# Patient Record
Sex: MALE
[De-identification: names, ages, dates, MRNs, and addresses within clinical notes are randomized per-mention and may not be internally consistent; named-entity substitution may affect disease eponyms.]

---

## 2018-12-27 ENCOUNTER — HOSPITAL ENCOUNTER (OUTPATIENT)
Dept: HOSPITAL 53 - M RAD | Age: 28
End: 2018-12-27
Attending: OTOLARYNGOLOGY
Payer: COMMERCIAL

## 2018-12-27 DIAGNOSIS — J34.1: ICD-10-CM

## 2018-12-27 DIAGNOSIS — J32.0: Primary | ICD-10-CM

## 2018-12-27 DIAGNOSIS — J34.2: ICD-10-CM

## 2018-12-27 NOTE — REP
MAXILLOFACIAL CT WITHOUT CONTRAST:

 

HISTORY:  Chronic maxillary sinusitis.  The patient reported that he was

uncertain whether yet previous sinus surgery as a child.

 

TECHNIQUE:  Helical scanning is acquired.  Axial and MPR coronal images are

generated.

 

CT FINDINGS:  Digital preliminary  views are unremarkable. There are

multiple mucous retention cysts in the maxillary sinuses bilaterally.  The

largest of these is on the right measuring 2.5 cm in greatest diameter.  There is

fairly extensive bilateral ethmoid sinus opacification.  The frontal sinuses are

clear with minimal mucosal changes inferiorly and medially.  Sphenoid aeration is

normal with mild mucosal thickening along the anterior wall of the sphenoid sinus

bilaterally.  Mastoid aeration is normal and symmetric.  No intraorbital

abnormality is seen.  No definite postoperative changes are seen.  The ostium of

each ostiomeatal complexes are obscured by mucosal thickening and mucus retention

cyst formation.  The bony nasal septum deviates to the left with a septal beak.

No nasal polyp is appreciated.

 

IMPRESSION: Jackelin sinusitis changes as above.  Leftward nasal septal deviation.

Both OMCs are obscured by mucosal thickening and mucus retention cysts.

 

 

Electronically Signed by

Dejan Ponce MD 12/27/2018 11:51 A

## 2019-06-18 ENCOUNTER — HOSPITAL ENCOUNTER (OUTPATIENT)
Dept: HOSPITAL 53 - M SDC | Age: 29
Discharge: HOME | End: 2019-06-18
Attending: OTOLARYNGOLOGY
Payer: COMMERCIAL

## 2019-06-18 VITALS — HEIGHT: 70 IN | WEIGHT: 110.4 LBS | BODY MASS INDEX: 15.81 KG/M2

## 2019-06-18 VITALS — SYSTOLIC BLOOD PRESSURE: 130 MMHG | DIASTOLIC BLOOD PRESSURE: 63 MMHG

## 2019-06-18 DIAGNOSIS — J34.2: ICD-10-CM

## 2019-06-18 DIAGNOSIS — J34.89: ICD-10-CM

## 2019-06-18 DIAGNOSIS — J32.9: Primary | ICD-10-CM

## 2019-06-18 DIAGNOSIS — J34.3: ICD-10-CM

## 2019-06-18 PROCEDURE — 30520 REPAIR OF NASAL SEPTUM: CPT

## 2019-06-18 PROCEDURE — 88300 SURGICAL PATH GROSS: CPT

## 2019-06-18 PROCEDURE — 88305 TISSUE EXAM BY PATHOLOGIST: CPT

## 2019-06-18 PROCEDURE — 31295 NSL/SINS NDSC SURG MAX SINS: CPT

## 2019-06-18 PROCEDURE — 31296 NSL/SINS NDSC SURG FRNT SINS: CPT

## 2019-06-18 PROCEDURE — 31254 NSL/SINS NDSC W/PRTL ETHMDCT: CPT

## 2019-06-18 PROCEDURE — 30140 RESECT INFERIOR TURBINATE: CPT

## 2019-06-18 NOTE — RO
DATE OF PROCEDURE:  06/18/2019

 

PREOPERATIVE DIAGNOSIS:  Chronic bilateral sinusitis with septal deviation,

turbinate hypertrophy, and a posterior septal spur.

 

POSTOPERATIVE DIAGNOSIS:

 

OPERATION PERFORMED:

1.  Bilateral balloon frontal sinuplasty with the Acclarent balloon device.

2.  Bilateral partial ethmoidectomy utilizing microdebrider.

3.  Bilateral maxillary sinusotomy using the Acclarent balloon device.

4.  In addition, the patient had septoplasty and bilateral inferior

turbinoplasties.

 

SURGEON:  Mane Elena Jr, MD

 

ASSISTANT:

 

ANESTHESIA:  General via endotracheal tube by Dr. Douglas.

 

INDICATIONS FOR PROCEDURE:  Patient with nasal congestion and bilateral chronic

sinusitis recalcitrant to medical therapy.

 

DESCRIPTION OF PROCEDURE:  After the patient was induced, intubated, prepped and

draped in usual fashion, nasal cavity was decongested with topical Afrin-soaked

pledgets followed by injection of 4-1/2 mL of 1% lidocaine, 1:100,000 epinephrine

with a 27-gauge needle.  Once this was done in both septums and also at the base

of the middle turbinate, the areas were further evaluated, and the left

hemitransfixion incision ensued, mucoperichondrium, mucoperiosteum, bony

cartilaginous junctions were .  A large posterior septal spur was

removed with Agatha posteriorly.  Superiorly, the deviated septum obstructing

the view of the left sinus was removed with Ruy-Garsia forceps near the

perpendicular plate of the ethmoid, and then more bone was removed with the

Ruy-Garsia posteriorly, inferiorly.

 

Once the septum was nice and straight, attention then was drawn to the endoscopic

portion where initially balloon dilation was done with the left frontal Acclarent

balloon device.  The left frontal sinus was cannulated and once it was

cannulated, was dilated up to 12 cm.  This was successfully dilated, a nice

opening was present.  A contour Propel device was placed in the frontal ostium.

 

Attention then was drawn to the right side where in a similar fashion it was

successfully cannulated and then also dilated.  At this point, this one was a

little bit more tight, but it was dilated up to 12 cm, and there were also polyps

present in the ethmoid.  The maxillary one was done on the left side was

cannulated with good free flow in the maxillary sinus with the Anahy device.  This

was then also ballooned up to 12 cm after confirmation with easily mobile Anahy

device.  And in a similar fashion, the same thing on the right side - it was

cannulated and again with good illumination of the cheek, this was also dilated

to 12 cm. Partial ethmoidectomy was done with the StraightShot microdebrider and

then Propel mini splints were placed in both partial ethmoidectomy sites and

hydrated as well as the contour.

 

At this point, attention was drawn to the inferior turbinate where utilizing the

Reflex 45 wand, three passes were made on the right inferior turbinate with

settings of 4 and 2 in the usual cauterization pattern.  In a similar fashion,

the left side was also done but with only two on the left side as this was the

side that was not as hypertrophied.

 

Then, #3-0 chromic was used to perform two septocolumellar stitches to close the

inferior aspect of the septum followed by transnasal suturing of Aguirre nasal

splints bilaterally and with 4-0 nylon.  The patient tolerated this well and then

NasoPore was trimmed to place around that area and there were no problems, no

complications.

 

Estimated blood loss was approximately 15-20 mL.

 

The patient was taken to recovery room in satisfactory condition.

## 2019-09-23 ENCOUNTER — HOSPITAL ENCOUNTER (EMERGENCY)
Dept: HOSPITAL 53 - M ED | Age: 29
Discharge: HOME | End: 2019-09-23
Payer: COMMERCIAL

## 2019-09-23 VITALS — HEIGHT: 67 IN | BODY MASS INDEX: 28.75 KG/M2 | WEIGHT: 183.2 LBS

## 2019-09-23 VITALS — DIASTOLIC BLOOD PRESSURE: 79 MMHG | SYSTOLIC BLOOD PRESSURE: 120 MMHG

## 2019-09-23 DIAGNOSIS — R00.2: Primary | ICD-10-CM

## 2019-09-23 DIAGNOSIS — K21.9: ICD-10-CM

## 2019-09-23 DIAGNOSIS — R07.89: ICD-10-CM

## 2019-09-23 LAB
BASOPHILS # BLD AUTO: 0.1 10^3/UL (ref 0–0.2)
BASOPHILS NFR BLD AUTO: 0.9 % (ref 0–1)
BUN SERPL-MCNC: 14 MG/DL (ref 7–18)
CALCIUM SERPL-MCNC: 9.2 MG/DL (ref 8.5–10.1)
CHLORIDE SERPL-SCNC: 107 MEQ/L (ref 98–107)
CK MB CFR.DF SERPL CALC: 0.83
CK MB CFR.DF SERPL CALC: 0.83
CK MB SERPL-MCNC: 1.1 NG/ML (ref ?–3.6)
CK MB SERPL-MCNC: < 1 NG/ML (ref ?–3.6)
CK SERPL-CCNC: 121 U/L (ref 39–308)
CK SERPL-CCNC: 133 U/L (ref 39–308)
CO2 SERPL-SCNC: 29 MEQ/L (ref 21–32)
CREAT SERPL-MCNC: 0.95 MG/DL (ref 0.7–1.3)
EOSINOPHIL # BLD AUTO: 0.5 10^3/UL (ref 0–0.5)
EOSINOPHIL NFR BLD AUTO: 6.8 % (ref 0–3)
GFR SERPL CREATININE-BSD FRML MDRD: > 60 ML/MIN/{1.73_M2} (ref 60–?)
GLUCOSE SERPL-MCNC: 97 MG/DL (ref 70–100)
HCT VFR BLD AUTO: 48.9 % (ref 42–52)
HGB BLD-MCNC: 16.7 G/DL (ref 13.5–17.5)
LYMPHOCYTES # BLD AUTO: 2 10^3/UL (ref 1.5–5)
LYMPHOCYTES NFR BLD AUTO: 28.8 % (ref 24–44)
MCH RBC QN AUTO: 30.4 PG (ref 27–33)
MCHC RBC AUTO-ENTMCNC: 34.2 G/DL (ref 32–36.5)
MCV RBC AUTO: 88.9 FL (ref 80–96)
MONOCYTES # BLD AUTO: 0.6 10^3/UL (ref 0–0.8)
MONOCYTES NFR BLD AUTO: 8.1 % (ref 0–5)
NEUTROPHILS # BLD AUTO: 3.7 10^3/UL (ref 1.5–8.5)
NEUTROPHILS NFR BLD AUTO: 55.1 % (ref 36–66)
PLATELET # BLD AUTO: 310 10^3/UL (ref 150–450)
POTASSIUM SERPL-SCNC: 4.2 MEQ/L (ref 3.5–5.1)
RBC # BLD AUTO: 5.5 10^6/UL (ref 4.3–6.1)
SODIUM SERPL-SCNC: 142 MEQ/L (ref 136–145)
TROPONIN I SERPL-MCNC: < 0.02 NG/ML (ref ?–0.1)
TROPONIN I SERPL-MCNC: < 0.02 NG/ML (ref ?–0.1)
WBC # BLD AUTO: 6.8 10^3/UL (ref 4–10)

## 2019-09-23 NOTE — ECGEPIP
[facility] - ED

                                       

                                       Test Date:    2019

Pat Name:     GIDEON FRANCO     Department:   

Patient ID:   [id number]                 Room:         -

Gender:       Male                     Technician:   MARYJANE

:          1990               Requested By: SMITHA MANCILLA

Order Number: [id number]     Reading MD:   Riaz Wells

                                 Measurements

Intervals                              Axis          

Rate:         77                       P:            54

OK:           161                      QRS:          70

QRSD:         93                       T:            30

QT:           339                                    

QTc:          386                                    

                           Interpretive Statements

SINUS RHYTHM

Comparison tracing not on file

Electronically Signed on 2019 22:06:19 EDT by Riaz Wells